# Patient Record
Sex: FEMALE | Race: ASIAN | NOT HISPANIC OR LATINO
[De-identification: names, ages, dates, MRNs, and addresses within clinical notes are randomized per-mention and may not be internally consistent; named-entity substitution may affect disease eponyms.]

---

## 2017-07-01 ENCOUNTER — HEALTH MAINTENANCE LETTER (OUTPATIENT)
Age: 32
End: 2017-07-01

## 2018-06-29 ENCOUNTER — TELEPHONE (OUTPATIENT)
Dept: TRANSPLANT | Facility: CLINIC | Age: 33
End: 2018-06-29

## 2018-07-03 ENCOUNTER — TELEPHONE (OUTPATIENT)
Dept: TRANSPLANT | Facility: CLINIC | Age: 33
End: 2018-07-03

## 2018-07-03 DIAGNOSIS — Z00.5 TRANSPLANT DONOR EVALUATION: Primary | ICD-10-CM

## 2018-07-03 NOTE — TELEPHONE ENCOUNTER
"[facility] BREEZE  [id number]      LIVING LIVER DONOR EVALUATION  Donor First Name Moreira Donor MRN    Donor Middle Name Sarah Completed 2018 10:05 PM   Donor Last Name Anyi Record Id [id number]    1985     BREEZE Screen PASSED     Intended Recipient  Recipient First Name Clemencia Relationship Acquaintance   Recipient Last Name Teo Recipient Diagnosis    Recipient   Recipient Insurance Provider    Recipient MRN  Recipient Insurance Type    Recipient Height      Recipient Weight      Recipient's ABO      Donor Information  Age 32 Race    Ht 160 cm (5' 3'') Ethnicity Not /   Wt 65.3 kg (144 lbs) Preferred Language English   BMI 25.60 kg/m   Required No   Gender Female Blood Type Unknown   Demographics  Home Address [address] [place] [address] # [phone]   [facility] Alternate #    Zip Code [address] Type    Country [place] Preferred Contact day Mon, Fri, Th, Wed, Tue   Email [email or web address] Preferred Contact time 1:00 PM-4:00 PM   Donor's Medical Information  Medical History Anxiety d/o NOS   Herniated Disc (Lumbar) Medications Lexapro   Surgical History Posterior Spinal Fusion (Thoracic, Lumbar, and/or Sacrum) Allergies Morphine : Rash   Social History EtOH: Rare (1-2 drinks/year)   Illicit Drug Use: Denies   Tobacco: Remote; Quit ; ( ppd x 1 year) Self-Reported Functional Status \"I am able to participate in strenuous sports such as swimming, singles tennis, football, basketball, or skiing\"   Family Medical History Cancer (Father)   Clots or Clotting Disease (denies)   Diabetes (denies)   Heart Disease (denies)   Hypertension (denies)   Inflammatory Bowel Disease (denies)   Liver Disease (denies)   Mental Illness (denies) Exercise Frequency Exercise (3 X per week)   Review of Organ Systems  Review of Systems Airway or Lungs: No   Blood Disorder: No   Cancer: No   Diabetes,Thyroid,Adrenal,Endocrine Disorder: No   Digestive " or Liver: No   Female Health: No   Heart or Circulatory System: No   Immune Diseases: No   Kidneys and Bladder: No   Muscles,Bones,Joints: Yes   Neuro: No   Psych: Yes   Donor's Social Information  Medical Insurance Status Has medical insurance Level of Education College or baccalaureate degree complete   Marital Status  Employment Status Full Time   Living Accommodation Owns own home/apartment Employer [employer]   Living Arrangement With spouse Occupation    High Risk Behaviors Blood transfusion < 12 months. (NO)   Commercial sex < 12 months. (NO)   Illicit IV drug use < 5yrs. (NO)   Other high risk sexual contact < 12 months. (NO)     Reason for Donation  Referral Friend or Family of [place] Candidate Reason for Donation My primary reason to donate is to give life to someone that wants to fight for it.   Donor Motivation Highly motivated donor Patient Comments    PCP Contact  PCP Name    PCP [facility]    PCP State    PCP Phone    Emergency Contact  First Name Gracie First Name Jose   Last Name lorna Last Name Nacho   Phone # [phone] Phone # [phone]   Phone Type Mobile Phone Type Mobile   Relationship Mother Relationship Friend or Other   Office Use  Reviewed By    Reviewed 6/28/2018   Admin Folder Accept   Comments 6/28 - Passed Eval   Lost for Followup    Extended Comments    BREEZE ID [email or web address]:[id number]   survey status completed   Activity History  Call  Task    Due Date 6/29/2018   Last Modified Date/Time 6/29/2018 1:29 PM   Comments

## 2018-07-03 NOTE — TELEPHONE ENCOUNTER
Unknown abo. Will currently send orders for abo only. ? Size discrepancy with recip that will need to be checked out if abo compat.